# Patient Record
Sex: FEMALE | Race: WHITE | URBAN - METROPOLITAN AREA
[De-identification: names, ages, dates, MRNs, and addresses within clinical notes are randomized per-mention and may not be internally consistent; named-entity substitution may affect disease eponyms.]

---

## 2017-02-06 RX ORDER — PROPRANOLOL HYDROCHLORIDE 20 MG/1
20 TABLET ORAL 2 TIMES DAILY
Qty: 180 TABLET | Refills: 0 | Status: SHIPPED | OUTPATIENT
Start: 2017-02-06 | End: 2017-02-14 | Stop reason: SDUPTHER

## 2017-02-06 RX ORDER — ATORVASTATIN CALCIUM 10 MG/1
TABLET, FILM COATED ORAL
Qty: 30 TABLET | Refills: 0 | Status: SHIPPED | OUTPATIENT
Start: 2017-02-06 | End: 2017-02-14 | Stop reason: SDUPTHER

## 2017-02-14 ENCOUNTER — OFFICE VISIT (OUTPATIENT)
Dept: INTERNAL MEDICINE CLINIC | Age: 47
End: 2017-02-14

## 2017-02-14 VITALS
SYSTOLIC BLOOD PRESSURE: 100 MMHG | WEIGHT: 112 LBS | RESPIRATION RATE: 18 BRPM | DIASTOLIC BLOOD PRESSURE: 75 MMHG | BODY MASS INDEX: 20.61 KG/M2 | HEART RATE: 70 BPM | HEIGHT: 62 IN

## 2017-02-14 DIAGNOSIS — J45.20 MILD INTERMITTENT ASTHMA WITHOUT COMPLICATION: ICD-10-CM

## 2017-02-14 DIAGNOSIS — G43.009 NONINTRACTABLE MIGRAINE, UNSPECIFIED MIGRAINE TYPE: ICD-10-CM

## 2017-02-14 DIAGNOSIS — E78.2 MIXED HYPERLIPIDEMIA: ICD-10-CM

## 2017-02-14 DIAGNOSIS — J30.2 SEASONAL ALLERGIC RHINITIS, UNSPECIFIED ALLERGIC RHINITIS TRIGGER: ICD-10-CM

## 2017-02-14 DIAGNOSIS — K21.9 GASTROESOPHAGEAL REFLUX DISEASE WITHOUT ESOPHAGITIS: Primary | ICD-10-CM

## 2017-02-14 PROCEDURE — 99214 OFFICE O/P EST MOD 30 MIN: CPT | Performed by: INTERNAL MEDICINE

## 2017-02-14 RX ORDER — METHOCARBAMOL 500 MG/1
500 TABLET, FILM COATED ORAL 2 TIMES DAILY PRN
Qty: 60 TABLET | Refills: 2 | Status: SHIPPED | OUTPATIENT
Start: 2017-02-14

## 2017-02-14 RX ORDER — ATORVASTATIN CALCIUM 10 MG/1
TABLET, FILM COATED ORAL
Qty: 90 TABLET | Refills: 1 | Status: SHIPPED | OUTPATIENT
Start: 2017-02-14

## 2017-02-14 RX ORDER — PROPRANOLOL HYDROCHLORIDE 20 MG/1
20 TABLET ORAL 2 TIMES DAILY
Qty: 180 TABLET | Refills: 1 | Status: SHIPPED | OUTPATIENT
Start: 2017-02-14

## 2017-03-20 ENCOUNTER — TELEPHONE (OUTPATIENT)
Dept: INTERNAL MEDICINE CLINIC | Age: 47
End: 2017-03-20

## 2017-04-03 ENCOUNTER — TELEPHONE (OUTPATIENT)
Dept: INTERNAL MEDICINE CLINIC | Age: 47
End: 2017-04-03

## 2017-04-11 RX ORDER — CIMETIDINE 400 MG/1
TABLET, FILM COATED ORAL
Qty: 90 TABLET | Refills: 0 | Status: SHIPPED | OUTPATIENT
Start: 2017-04-11

## 2024-02-01 ENCOUNTER — OFFICE VISIT (OUTPATIENT)
Age: 54
End: 2024-02-01

## 2024-02-01 VITALS
WEIGHT: 111.2 LBS | HEART RATE: 109 BPM | TEMPERATURE: 98.3 F | BODY MASS INDEX: 20.34 KG/M2 | SYSTOLIC BLOOD PRESSURE: 136 MMHG | DIASTOLIC BLOOD PRESSURE: 79 MMHG | OXYGEN SATURATION: 95 %

## 2024-02-01 DIAGNOSIS — H65.01 RIGHT ACUTE SEROUS OTITIS MEDIA, RECURRENCE NOT SPECIFIED: Primary | ICD-10-CM

## 2024-02-01 DIAGNOSIS — S16.1XXA NECK STRAIN, INITIAL ENCOUNTER: ICD-10-CM

## 2024-02-01 RX ORDER — AMOXICILLIN 875 MG/1
875 TABLET, COATED ORAL 2 TIMES DAILY
Qty: 20 TABLET | Refills: 0 | Status: SHIPPED | OUTPATIENT
Start: 2024-02-01 | End: 2024-02-11

## 2024-02-01 RX ORDER — METHOCARBAMOL 750 MG/1
750 TABLET, FILM COATED ORAL 3 TIMES DAILY
Qty: 20 TABLET | Refills: 0 | Status: SHIPPED | OUTPATIENT
Start: 2024-02-01 | End: 2024-02-11

## 2024-02-01 ASSESSMENT — ENCOUNTER SYMPTOMS
GASTROINTESTINAL NEGATIVE: 1
RESPIRATORY NEGATIVE: 1
EYES NEGATIVE: 1
EYE PAIN: 0

## 2024-02-01 NOTE — PROGRESS NOTES
Edwina Bacon (:  1970) is a 53 y.o. female,New patient, here for evaluation of the following chief complaint(s):  Otalgia (Right ear pain  popping 1 month /Back and neck pain spasm last week )      ASSESSMENT/PLAN:    ICD-10-CM    1. Right acute serous otitis media, recurrence not specified  H65.01 amoxicillin (AMOXIL) 875 MG tablet     Jeramie Madsen MD, Otolaryngology, [facility]      2. Neck strain, initial encounter  S16.1XXA methocarbamol (ROBAXIN-750) 750 MG tablet          Patient is a thin 53-year-old female in no distress.  Patient does not appear toxic.  Examination of the right ear reveals a bolus fluid noted behind the right TM consistent with some serous effusion.  The left 1 was normal.  Intermittent hearing loss noted.  It is decreased.  Patient will be placed on amoxicillin.  Patient was also seen for some neck strain after some moving.  I suspect it was more muscle skeletal placed on Robaxin patient had it before.  I told patient given the chronicity of this patient may need to see a ENT specialist.  A referral given.  Patient will follow-up.  Patient discharged in good condition.    Follow up in 7 days if symptoms persist or if symptoms worsen.    SUBJECTIVE/OBJECTIVE:  Patient is a 53-year-old white female who smokes quite a bit presents with 1 month history of intermittent right hearing loss and pain.  Patient states feels like there is fluid in her ear.  At times she can hear and unable to pop her ears.  The left 1 is fine.  Patient has not been to a doctor in a while.  There has been no cough no congestion.  Intermittent hearing loss.  Patient has no chills or fever.            Vitals:    24 1306   BP: 136/79   Site: Right Upper Arm   Position: Sitting   Cuff Size: Large Adult   Pulse: (!) 109   Temp: 98.3 °F (36.8 °C)   TempSrc: Oral   SpO2: 95%   Weight: 50.4 kg (111 lb 3.2 oz)       Review of Systems   Constitutional: Negative.    HENT:  Positive for ear  (3) walks occasionally

## 2024-03-12 ENCOUNTER — OFFICE VISIT (OUTPATIENT)
Age: 54
End: 2024-03-12

## 2024-03-12 VITALS
BODY MASS INDEX: 20.5 KG/M2 | RESPIRATION RATE: 20 BRPM | HEIGHT: 62 IN | HEART RATE: 116 BPM | DIASTOLIC BLOOD PRESSURE: 79 MMHG | OXYGEN SATURATION: 95 % | SYSTOLIC BLOOD PRESSURE: 132 MMHG | TEMPERATURE: 98.4 F | WEIGHT: 111.4 LBS

## 2024-03-12 DIAGNOSIS — H66.92 ACUTE LEFT OTITIS MEDIA: Primary | ICD-10-CM

## 2024-03-12 RX ORDER — CEFDINIR 300 MG/1
300 CAPSULE ORAL 2 TIMES DAILY
Qty: 20 CAPSULE | Refills: 0 | Status: SHIPPED | OUTPATIENT
Start: 2024-03-12 | End: 2024-03-22

## 2024-03-12 RX ORDER — PREDNISONE 20 MG/1
20 TABLET ORAL 2 TIMES DAILY
Qty: 10 TABLET | Refills: 0 | Status: SHIPPED | OUTPATIENT
Start: 2024-03-12 | End: 2024-03-17

## 2024-03-12 ASSESSMENT — ENCOUNTER SYMPTOMS
SINUS PRESSURE: 0
DIARRHEA: 0
ABDOMINAL PAIN: 0
SORE THROAT: 0
VOMITING: 0
RHINORRHEA: 0
COUGH: 0
EYE REDNESS: 0

## 2024-03-12 NOTE — PROGRESS NOTES
erythematous and bulging.      Nose: No congestion or rhinorrhea.      Mouth/Throat:      Mouth: Mucous membranes are moist.      Pharynx: No posterior oropharyngeal erythema.   Eyes:      Conjunctiva/sclera: Conjunctivae normal.      Pupils: Pupils are equal, round, and reactive to light.   Cardiovascular:      Rate and Rhythm: Normal rate and regular rhythm.   Pulmonary:      Effort: Pulmonary effort is normal. No respiratory distress.      Breath sounds: Normal breath sounds.   Musculoskeletal:      Cervical back: Neck supple. No rigidity.   Lymphadenopathy:      Cervical: No cervical adenopathy.   Skin:     Findings: No rash.   Neurological:      General: No focal deficit present.      Mental Status: She is alert and oriented to person, place, and time.           An electronic signature was used to authenticate this note.    --NATHANAEL ISAAC MD

## 2024-04-20 ENCOUNTER — OFFICE VISIT (OUTPATIENT)
Age: 54
End: 2024-04-20

## 2024-04-20 VITALS
BODY MASS INDEX: 20.8 KG/M2 | WEIGHT: 113 LBS | TEMPERATURE: 98 F | RESPIRATION RATE: 18 BRPM | DIASTOLIC BLOOD PRESSURE: 80 MMHG | HEART RATE: 112 BPM | OXYGEN SATURATION: 97 % | HEIGHT: 62 IN | SYSTOLIC BLOOD PRESSURE: 160 MMHG

## 2024-04-20 DIAGNOSIS — I10 HYPERTENSION, UNSPECIFIED TYPE: ICD-10-CM

## 2024-04-20 DIAGNOSIS — J02.9 ACUTE PHARYNGITIS, UNSPECIFIED ETIOLOGY: Primary | ICD-10-CM

## 2024-04-20 RX ORDER — IBUPROFEN 600 MG/1
600 TABLET ORAL 3 TIMES DAILY PRN
Qty: 30 TABLET | Refills: 0 | Status: SHIPPED | OUTPATIENT
Start: 2024-04-20

## 2024-04-20 RX ORDER — LOSARTAN POTASSIUM 50 MG/1
50 TABLET ORAL DAILY
Qty: 30 TABLET | Refills: 1 | Status: SHIPPED | OUTPATIENT
Start: 2024-04-20

## 2024-04-20 RX ORDER — AMOXICILLIN 875 MG/1
875 TABLET, COATED ORAL 2 TIMES DAILY
Qty: 14 TABLET | Refills: 0 | Status: SHIPPED | OUTPATIENT
Start: 2024-04-20 | End: 2024-04-27

## 2024-04-20 ASSESSMENT — ENCOUNTER SYMPTOMS
DIARRHEA: 0
SHORTNESS OF BREATH: 0
ABDOMINAL PAIN: 0
NAUSEA: 0
WHEEZING: 0
SORE THROAT: 1
VOMITING: 0
COUGH: 0
RHINORRHEA: 0

## 2024-04-20 NOTE — PROGRESS NOTES
Edwina Bacon (:  1970) is a 53 y.o. female,Established patient, here for evaluation of the following chief complaint(s):  Pharyngitis      ASSESSMENT/PLAN:  1. Acute pharyngitis, unspecified etiology    - amoxicillin (AMOXIL) 875 MG tablet; Take 1 tablet by mouth 2 times daily for 7 days  Dispense: 14 tablet; Refill: 0  - ibuprofen (ADVIL;MOTRIN) 600 MG tablet; Take 1 tablet by mouth 3 times daily as needed for Pain  Dispense: 30 tablet; Refill: 0    2. Hypertension, unspecified type    - losartan (COZAAR) 50 MG tablet; Take 1 tablet by mouth daily  Dispense: 30 tablet; Refill: 1     -pt was advised to f/u with her PCP in 1-2 wks  No follow-ups on file.    SUBJECTIVE/OBJECTIVE:  Pt has a h/o HTN,not taking medication      History provided by:  Patient  Pharyngitis  Severity:  Moderate  Onset quality:  Gradual  Duration:  2 days  Timing:  Constant  Progression:  Worsening  Chronicity:  New  Associated symptoms: ear pain, headaches and sore throat    Associated symptoms: no abdominal pain, no chest pain, no congestion, no cough, no diarrhea, no fatigue, no fever, no loss of consciousness, no myalgias, no nausea, no rash, no rhinorrhea, no shortness of breath, no vomiting and no wheezing        Vitals:    24 1043   BP: (!) 160/80   Pulse: (!) 112   Resp: 18   Temp: 98 °F (36.7 °C)   SpO2: 97%   Weight: 51.3 kg (113 lb)   Height: 1.575 m (5' 2\")       Review of Systems   Constitutional:  Negative for fatigue and fever.   HENT:  Positive for ear pain and sore throat. Negative for congestion and rhinorrhea.    Respiratory:  Negative for cough, shortness of breath and wheezing.    Cardiovascular:  Negative for chest pain.   Gastrointestinal:  Negative for abdominal pain, diarrhea, nausea and vomiting.   Musculoskeletal:  Negative for myalgias.   Skin:  Negative for rash.   Neurological:  Positive for headaches. Negative for loss of consciousness.       Physical Exam  Constitutional:       General: She

## 2024-04-21 ENCOUNTER — TELEPHONE (OUTPATIENT)
Age: 54
End: 2024-04-21

## 2024-05-12 DIAGNOSIS — I10 HYPERTENSION, UNSPECIFIED TYPE: ICD-10-CM

## 2024-05-15 RX ORDER — LOSARTAN POTASSIUM 50 MG/1
50 TABLET ORAL DAILY
Qty: 90 TABLET | Refills: 1 | OUTPATIENT
Start: 2024-05-15

## 2024-05-30 ENCOUNTER — OFFICE VISIT (OUTPATIENT)
Age: 54
End: 2024-05-30

## 2024-05-30 VITALS
HEART RATE: 106 BPM | WEIGHT: 115.2 LBS | SYSTOLIC BLOOD PRESSURE: 135 MMHG | RESPIRATION RATE: 18 BRPM | HEIGHT: 62 IN | BODY MASS INDEX: 21.2 KG/M2 | DIASTOLIC BLOOD PRESSURE: 83 MMHG | OXYGEN SATURATION: 96 % | TEMPERATURE: 98 F

## 2024-05-30 DIAGNOSIS — H66.004 RECURRENT ACUTE SUPPURATIVE OTITIS MEDIA OF RIGHT EAR WITHOUT SPONTANEOUS RUPTURE OF TYMPANIC MEMBRANE: ICD-10-CM

## 2024-05-30 DIAGNOSIS — S39.012A LOW BACK STRAIN, INITIAL ENCOUNTER: Primary | ICD-10-CM

## 2024-05-30 RX ORDER — METHOCARBAMOL 750 MG/1
750 TABLET, FILM COATED ORAL 4 TIMES DAILY
Qty: 20 TABLET | Refills: 0 | Status: SHIPPED | OUTPATIENT
Start: 2024-05-30

## 2024-05-30 RX ORDER — AMOXICILLIN AND CLAVULANATE POTASSIUM 875; 125 MG/1; MG/1
1 TABLET, FILM COATED ORAL 2 TIMES DAILY
Qty: 20 TABLET | Refills: 0 | Status: SHIPPED | OUTPATIENT
Start: 2024-05-30 | End: 2024-06-09

## 2024-05-30 ASSESSMENT — ENCOUNTER SYMPTOMS
SORE THROAT: 0
BACK PAIN: 1
RHINORRHEA: 0

## 2024-05-30 NOTE — PROGRESS NOTES
Edwina Bacon (:  1970) is a 53 y.o. female,Established patient, here for evaluation of the following chief complaint(s):  Otalgia (Pt c/o ears feel clogged, painful. Pt states it feels like fluid is inside ears )      ASSESSMENT/PLAN:    ICD-10-CM    1. Low back strain, initial encounter  S39.012A Referral for No Primary Care Physician - Routine     methocarbamol (ROBAXIN-750) 750 MG tablet      2. Recurrent acute suppurative otitis media of right ear without spontaneous rupture of tympanic membrane  H66.004 Referral for No Primary Care Physician - Routine     amoxicillin-clavulanate (AUGMENTIN) 875-125 MG per tablet        Clinical exam consistent with low back strain  Recurrent otitis media    Patient verbalized understanding of printed and verbal discharge instructions including follow up care.    Follow up in 7 days if symptoms persist or if symptoms worsen.    SUBJECTIVE/OBJECTIVE:    History provided by:  Patient  Otalgia   There is pain in the right ear. This is a recurrent problem. The current episode started yesterday. The problem has been unchanged. There has been no fever. The pain is at a severity of 6/10. Pertinent negatives include no ear discharge, rhinorrhea or sore throat. She has tried nothing for the symptoms. The treatment provided no relief.           Vitals:    24 1424   BP: 135/83   Pulse: (!) 106   Resp: 18   Temp: 98 °F (36.7 °C)   SpO2: 96%   Weight: 52.3 kg (115 lb 3.2 oz)   Height: 1.575 m (5' 2\")       Review of Systems   HENT:  Positive for ear pain. Negative for ear discharge, rhinorrhea and sore throat.    Musculoskeletal:  Positive for back pain.       Physical Exam  Constitutional:       Appearance: Normal appearance.   HENT:      Right Ear: Tenderness present. Tympanic membrane is injected, erythematous and bulging.      Left Ear: Tympanic membrane, ear canal and external ear normal.      Nose:      Right Sinus: No maxillary sinus tenderness or frontal sinus

## 2024-06-03 ENCOUNTER — OFFICE VISIT (OUTPATIENT)
Dept: INTERNAL MEDICINE CLINIC | Age: 54
End: 2024-06-03
Payer: MEDICARE

## 2024-06-03 VITALS
BODY MASS INDEX: 20.8 KG/M2 | WEIGHT: 113 LBS | OXYGEN SATURATION: 97 % | SYSTOLIC BLOOD PRESSURE: 122 MMHG | HEART RATE: 105 BPM | DIASTOLIC BLOOD PRESSURE: 78 MMHG | HEIGHT: 62 IN

## 2024-06-03 DIAGNOSIS — Z11.3 SCREEN FOR STD (SEXUALLY TRANSMITTED DISEASE): ICD-10-CM

## 2024-06-03 DIAGNOSIS — J45.20 MILD INTERMITTENT ASTHMA WITHOUT COMPLICATION: ICD-10-CM

## 2024-06-03 DIAGNOSIS — J30.2 SEASONAL ALLERGIES: ICD-10-CM

## 2024-06-03 DIAGNOSIS — Z00.00 MEDICARE ANNUAL WELLNESS VISIT, SUBSEQUENT: ICD-10-CM

## 2024-06-03 DIAGNOSIS — I10 HYPERTENSION, UNSPECIFIED TYPE: ICD-10-CM

## 2024-06-03 DIAGNOSIS — E78.2 MIXED HYPERLIPIDEMIA: Primary | ICD-10-CM

## 2024-06-03 DIAGNOSIS — K21.9 GASTROESOPHAGEAL REFLUX DISEASE WITHOUT ESOPHAGITIS: ICD-10-CM

## 2024-06-03 DIAGNOSIS — E78.2 MIXED HYPERLIPIDEMIA: ICD-10-CM

## 2024-06-03 DIAGNOSIS — M54.50 ACUTE BILATERAL LOW BACK PAIN WITHOUT SCIATICA: ICD-10-CM

## 2024-06-03 DIAGNOSIS — Z23 NEED FOR PROPHYLACTIC VACCINATION AGAINST STREPTOCOCCUS PNEUMONIAE (PNEUMOCOCCUS): ICD-10-CM

## 2024-06-03 DIAGNOSIS — G43.909 MIGRAINE WITHOUT STATUS MIGRAINOSUS, NOT INTRACTABLE, UNSPECIFIED MIGRAINE TYPE: ICD-10-CM

## 2024-06-03 LAB — HCV AB SERPL QL IA: NORMAL

## 2024-06-03 PROCEDURE — 3017F COLORECTAL CA SCREEN DOC REV: CPT | Performed by: INTERNAL MEDICINE

## 2024-06-03 PROCEDURE — 4004F PT TOBACCO SCREEN RCVD TLK: CPT | Performed by: INTERNAL MEDICINE

## 2024-06-03 PROCEDURE — G0439 PPPS, SUBSEQ VISIT: HCPCS | Performed by: INTERNAL MEDICINE

## 2024-06-03 PROCEDURE — G8427 DOCREV CUR MEDS BY ELIG CLIN: HCPCS | Performed by: INTERNAL MEDICINE

## 2024-06-03 PROCEDURE — 90677 PCV20 VACCINE IM: CPT | Performed by: INTERNAL MEDICINE

## 2024-06-03 PROCEDURE — 3078F DIAST BP <80 MM HG: CPT | Performed by: INTERNAL MEDICINE

## 2024-06-03 PROCEDURE — G0009 ADMIN PNEUMOCOCCAL VACCINE: HCPCS | Performed by: INTERNAL MEDICINE

## 2024-06-03 PROCEDURE — 99204 OFFICE O/P NEW MOD 45 MIN: CPT | Performed by: INTERNAL MEDICINE

## 2024-06-03 PROCEDURE — G8420 CALC BMI NORM PARAMETERS: HCPCS | Performed by: INTERNAL MEDICINE

## 2024-06-03 PROCEDURE — 3074F SYST BP LT 130 MM HG: CPT | Performed by: INTERNAL MEDICINE

## 2024-06-03 RX ORDER — FEXOFENADINE HCL 180 MG/1
180 TABLET ORAL DAILY
Qty: 30 TABLET | Refills: 5 | Status: SHIPPED | OUTPATIENT
Start: 2024-06-03 | End: 2024-11-30

## 2024-06-03 RX ORDER — FLUTICASONE PROPIONATE 50 MCG
1 SPRAY, SUSPENSION (ML) NASAL DAILY
Qty: 16 G | Refills: 0 | Status: SHIPPED | OUTPATIENT
Start: 2024-06-03

## 2024-06-03 RX ORDER — MONTELUKAST SODIUM 10 MG/1
10 TABLET ORAL DAILY
Qty: 30 TABLET | Refills: 3 | Status: SHIPPED | OUTPATIENT
Start: 2024-06-03

## 2024-06-03 RX ORDER — ASPIRIN 325 MG
325 TABLET ORAL 2 TIMES DAILY
COMMUNITY

## 2024-06-03 RX ORDER — METHOCARBAMOL 750 MG/1
750 TABLET, FILM COATED ORAL 4 TIMES DAILY
Qty: 20 TABLET | Refills: 0 | Status: SHIPPED | OUTPATIENT
Start: 2024-06-03

## 2024-06-03 RX ORDER — PREDNISONE 20 MG/1
20 TABLET ORAL 2 TIMES DAILY
Qty: 10 TABLET | Refills: 0 | Status: SHIPPED | OUTPATIENT
Start: 2024-06-03 | End: 2024-06-08

## 2024-06-03 SDOH — ECONOMIC STABILITY: FOOD INSECURITY: WITHIN THE PAST 12 MONTHS, THE FOOD YOU BOUGHT JUST DIDN'T LAST AND YOU DIDN'T HAVE MONEY TO GET MORE.: PATIENT DECLINED

## 2024-06-03 SDOH — ECONOMIC STABILITY: INCOME INSECURITY: HOW HARD IS IT FOR YOU TO PAY FOR THE VERY BASICS LIKE FOOD, HOUSING, MEDICAL CARE, AND HEATING?: PATIENT DECLINED

## 2024-06-03 SDOH — ECONOMIC STABILITY: HOUSING INSECURITY
IN THE LAST 12 MONTHS, WAS THERE A TIME WHEN YOU DID NOT HAVE A STEADY PLACE TO SLEEP OR SLEPT IN A SHELTER (INCLUDING NOW)?: PATIENT DECLINED

## 2024-06-03 SDOH — ECONOMIC STABILITY: FOOD INSECURITY: WITHIN THE PAST 12 MONTHS, YOU WORRIED THAT YOUR FOOD WOULD RUN OUT BEFORE YOU GOT MONEY TO BUY MORE.: PATIENT DECLINED

## 2024-06-03 ASSESSMENT — PATIENT HEALTH QUESTIONNAIRE - PHQ9
SUM OF ALL RESPONSES TO PHQ QUESTIONS 1-9: 15
8. MOVING OR SPEAKING SO SLOWLY THAT OTHER PEOPLE COULD HAVE NOTICED. OR THE OPPOSITE, BEING SO FIGETY OR RESTLESS THAT YOU HAVE BEEN MOVING AROUND A LOT MORE THAN USUAL: SEVERAL DAYS
1. LITTLE INTEREST OR PLEASURE IN DOING THINGS: MORE THAN HALF THE DAYS
4. FEELING TIRED OR HAVING LITTLE ENERGY: NEARLY EVERY DAY
SUM OF ALL RESPONSES TO PHQ QUESTIONS 1-9: 15
6. FEELING BAD ABOUT YOURSELF - OR THAT YOU ARE A FAILURE OR HAVE LET YOURSELF OR YOUR FAMILY DOWN: NOT AT ALL
SUM OF ALL RESPONSES TO PHQ9 QUESTIONS 1 & 2: 5
10. IF YOU CHECKED OFF ANY PROBLEMS, HOW DIFFICULT HAVE THESE PROBLEMS MADE IT FOR YOU TO DO YOUR WORK, TAKE CARE OF THINGS AT HOME, OR GET ALONG WITH OTHER PEOPLE: SOMEWHAT DIFFICULT
SUM OF ALL RESPONSES TO PHQ QUESTIONS 1-9: 15
5. POOR APPETITE OR OVEREATING: NOT AT ALL
3. TROUBLE FALLING OR STAYING ASLEEP: NEARLY EVERY DAY
9. THOUGHTS THAT YOU WOULD BE BETTER OFF DEAD, OR OF HURTING YOURSELF: NOT AT ALL
2. FEELING DOWN, DEPRESSED OR HOPELESS: NEARLY EVERY DAY
7. TROUBLE CONCENTRATING ON THINGS, SUCH AS READING THE NEWSPAPER OR WATCHING TELEVISION: NEARLY EVERY DAY
SUM OF ALL RESPONSES TO PHQ QUESTIONS 1-9: 15

## 2024-06-03 ASSESSMENT — ENCOUNTER SYMPTOMS
COUGH: 0
CONSTIPATION: 0
BLOOD IN STOOL: 0
WHEEZING: 0
COLOR CHANGE: 0
SHORTNESS OF BREATH: 0
CHEST TIGHTNESS: 0
ABDOMINAL PAIN: 0
SINUS PAIN: 0

## 2024-06-03 ASSESSMENT — LIFESTYLE VARIABLES
HOW MANY STANDARD DRINKS CONTAINING ALCOHOL DO YOU HAVE ON A TYPICAL DAY: PATIENT DOES NOT DRINK
HOW OFTEN DO YOU HAVE A DRINK CONTAINING ALCOHOL: NEVER

## 2024-06-03 NOTE — PROGRESS NOTES
Edwina Bacon (:  1970) is a 53 y.o. female,Established patient, here for evaluation of the following chief complaint(s):  Established New Doctor and Medicare AWV      Assessment & Plan   ASSESSMENT/PLAN:  1. Mixed hyperlipidemia  -     Lipid Panel; Future  -     Comprehensive Metabolic Panel; Future  2. Mild intermittent asthma without complication  -     CBC with Auto Differential; Future  3. Migraine without status migrainosus, not intractable, unspecified migraine type  4. Gastroesophageal reflux disease without esophagitis  5. Seasonal allergies  -     montelukast (SINGULAIR) 10 MG tablet; Take 1 tablet by mouth daily, Disp-30 tablet, R-3Normal  -     fexofenadine (ALLEGRA) 180 MG tablet; Take 1 tablet by mouth daily, Disp-30 tablet, R-5Normal  -     fluticasone (FLONASE) 50 MCG/ACT nasal spray; 1 spray by Each Nostril route daily, Disp-16 g, R-0Normal  6. Acute bilateral low back pain without sciatica  -     predniSONE (DELTASONE) 20 MG tablet; Take 1 tablet by mouth 2 times daily for 5 days, Disp-10 tablet, R-0Normal  -     methocarbamol (ROBAXIN-750) 750 MG tablet; Take 1 tablet by mouth 4 times daily, Disp-20 tablet, R-0Normal  7. Need for prophylactic vaccination against Streptococcus pneumoniae (pneumococcus)  -     Pneumococcal, PCV20, PREVNAR 20, (age 6w+), IM, PF  8. Screen for STD (sexually transmitted disease)  -     Hepatitis C Antibody; Future  -     HIV Screen; Future  -     Syphilis Antibody Cascading Reflex; Future  -     C.trachomatis N.gonorrhoeae DNA, Urine; Future  9. Medicare annual wellness visit, subsequent  Patient is presenting as a new patient today most of her problems rotate around having a lot of congestion and allergies we did discuss the importance of taking medications properly and at this point we can have her start back on antihistamines with the nasal cortisone as well as Singulair    The patient has been having some back pain since she is to visit the

## 2024-06-04 LAB
ALBUMIN SERPL-MCNC: 4.7 G/DL (ref 3.4–5)
ALBUMIN/GLOB SERPL: 2.2 {RATIO} (ref 1.1–2.2)
ALP SERPL-CCNC: 54 U/L (ref 40–129)
ALT SERPL-CCNC: 31 U/L (ref 10–40)
ANION GAP SERPL CALCULATED.3IONS-SCNC: 14 MMOL/L (ref 3–16)
AST SERPL-CCNC: 28 U/L (ref 15–37)
BASOPHILS # BLD: 0.1 K/UL (ref 0–0.2)
BASOPHILS NFR BLD: 0.9 %
BILIRUB SERPL-MCNC: <0.2 MG/DL (ref 0–1)
BUN SERPL-MCNC: 8 MG/DL (ref 7–20)
C TRACH DNA UR QL NAA+PROBE: NEGATIVE
CALCIUM SERPL-MCNC: 10.1 MG/DL (ref 8.3–10.6)
CHLORIDE SERPL-SCNC: 97 MMOL/L (ref 99–110)
CHOLEST SERPL-MCNC: 217 MG/DL (ref 0–199)
CO2 SERPL-SCNC: 24 MMOL/L (ref 21–32)
CREAT SERPL-MCNC: 0.7 MG/DL (ref 0.6–1.1)
DEPRECATED RDW RBC AUTO: 13.2 % (ref 12.4–15.4)
EOSINOPHIL NFR BLD: 7.4 %
GFR SERPLBLD CREATININE-BSD FMLA CKD-EPI: >90 ML/MIN/{1.73_M2}
GLUCOSE SERPL-MCNC: 80 MG/DL (ref 70–99)
HCT VFR BLD AUTO: 38.8 % (ref 36–48)
HDLC SERPL-MCNC: 59 MG/DL (ref 40–60)
HGB BLD-MCNC: 13.5 G/DL (ref 12–16)
HIV 1+2 AB+HIV1 P24 AG SERPL QL IA: NORMAL
HIV 2 AB SERPL QL IA: NORMAL
HIV1 AB SERPL QL IA: NORMAL
HIV1 P24 AG SERPL QL IA: NORMAL
LDLC SERPL CALC-MCNC: 125 MG/DL
LYMPHOCYTES # BLD: 1.9 K/UL (ref 1–5.1)
LYMPHOCYTES NFR BLD: 18.7 %
MCH RBC QN AUTO: 32.4 PG (ref 26–34)
MCHC RBC AUTO-ENTMCNC: 34.9 G/DL (ref 31–36)
MCV RBC AUTO: 92.7 FL (ref 80–100)
MONOCYTES # BLD: 0.5 K/UL (ref 0–1.3)
MONOCYTES NFR BLD: 5.1 %
N GONORRHOEA DNA UR QL NAA+PROBE: NEGATIVE
NEUTROPHILS # BLD: 6.9 K/UL (ref 1.7–7.7)
NEUTROPHILS NFR BLD: 67.9 %
PLATELET # BLD AUTO: 310 K/UL (ref 135–450)
PMV BLD AUTO: 7.9 FL (ref 5–10.5)
POTASSIUM SERPL-SCNC: 4.9 MMOL/L (ref 3.5–5.1)
PROT SERPL-MCNC: 6.8 G/DL (ref 6.4–8.2)
RBC # BLD AUTO: 4.18 M/UL (ref 4–5.2)
REAGIN+T PALLIDUM IGG+IGM SERPL-IMP: NORMAL
SODIUM SERPL-SCNC: 135 MMOL/L (ref 136–145)
TRIGL SERPL-MCNC: 165 MG/DL (ref 0–150)
VLDLC SERPL CALC-MCNC: 33 MG/DL
WBC # BLD AUTO: 10.2 K/UL (ref 4–11)

## 2024-06-04 NOTE — RESULT ENCOUNTER NOTE
Please advise patient that the results were acceptable with the exception of the cholesterol and sugar being elevated, the patient is advised to work on aggressive lifestyle modification including different food choices as well as lowering the fat content of the diet in addition to daily exercises with a minimum of 150 minutes/week in 10-minute increments of mild to moderate exercise like brisk walking    The 10-year ASCVD risk score (Elder YOUNG, et al., 2019) is: 4.7%    Values used to calculate the score:      Age: 53 years      Sex: Female      Is Non- : No      Diabetic: No      Tobacco smoker: Yes      Systolic Blood Pressure: 122 mmHg      Is BP treated: Yes      HDL Cholesterol: 59 mg/dL      Total Cholesterol: 217 mg/dL

## 2024-06-05 ENCOUNTER — PATIENT MESSAGE (OUTPATIENT)
Dept: INTERNAL MEDICINE CLINIC | Age: 54
End: 2024-06-05

## 2024-06-05 DIAGNOSIS — M54.50 ACUTE BILATERAL LOW BACK PAIN WITHOUT SCIATICA: Primary | ICD-10-CM

## 2024-06-05 DIAGNOSIS — I10 HYPERTENSION, UNSPECIFIED TYPE: ICD-10-CM

## 2024-06-05 RX ORDER — LOSARTAN POTASSIUM 50 MG/1
50 TABLET ORAL DAILY
Qty: 30 TABLET | Refills: 1 | OUTPATIENT
Start: 2024-06-05

## 2024-06-06 RX ORDER — TIZANIDINE 2 MG/1
2 TABLET ORAL 3 TIMES DAILY PRN
Qty: 30 TABLET | Refills: 0 | Status: SHIPPED | OUTPATIENT
Start: 2024-06-06

## 2024-06-06 NOTE — TELEPHONE ENCOUNTER
From: Edwina Bacon  To: Dr. Dale Senior  Sent: 6/5/2024 2:59 PM EDT  Subject: Methocarbamol 750mg    Received a letter today from prescription drug plan stating that they only fill temporary supply of multiple fills to provide up maykel 30 day supply . In the event that the muscle relaxer is still needed the prescription drug will have to be changed or requesting an approval. I will try and bring the letter with me to my appointment in July.  Edwina Bacon

## 2024-06-10 RX ORDER — LOSARTAN POTASSIUM 50 MG/1
50 TABLET ORAL DAILY
Qty: 30 TABLET | Refills: 1 | OUTPATIENT
Start: 2024-06-10

## 2024-06-16 DIAGNOSIS — I10 HYPERTENSION, UNSPECIFIED TYPE: ICD-10-CM

## 2024-06-17 RX ORDER — LOSARTAN POTASSIUM 50 MG/1
50 TABLET ORAL DAILY
Qty: 30 TABLET | Refills: 1 | OUTPATIENT
Start: 2024-06-17

## 2024-06-18 ENCOUNTER — TELEPHONE (OUTPATIENT)
Dept: INTERNAL MEDICINE CLINIC | Age: 54
End: 2024-06-18

## 2024-06-18 DIAGNOSIS — I10 HYPERTENSION, UNSPECIFIED TYPE: ICD-10-CM

## 2024-06-18 RX ORDER — LOSARTAN POTASSIUM 50 MG/1
50 TABLET ORAL DAILY
Qty: 30 TABLET | Refills: 1 | Status: SHIPPED | OUTPATIENT
Start: 2024-06-18

## 2024-06-18 NOTE — TELEPHONE ENCOUNTER
[facility]  calling requesting refill of Losartan     Last written 4/24/24 by another   Last OV 6/3/24  Next OV 6/25/24  Last recommended OV NA     Please send to [facility] [place]

## 2024-06-25 ENCOUNTER — OFFICE VISIT (OUTPATIENT)
Dept: INTERNAL MEDICINE CLINIC | Age: 54
End: 2024-06-25
Payer: MEDICARE

## 2024-06-25 VITALS
WEIGHT: 113 LBS | HEART RATE: 104 BPM | HEIGHT: 62 IN | DIASTOLIC BLOOD PRESSURE: 80 MMHG | BODY MASS INDEX: 20.8 KG/M2 | OXYGEN SATURATION: 96 % | SYSTOLIC BLOOD PRESSURE: 124 MMHG

## 2024-06-25 DIAGNOSIS — J30.2 SEASONAL ALLERGIES: Primary | ICD-10-CM

## 2024-06-25 DIAGNOSIS — F20.0 PARANOID SCHIZOPHRENIA (HCC): ICD-10-CM

## 2024-06-25 DIAGNOSIS — M54.50 ACUTE BILATERAL LOW BACK PAIN WITHOUT SCIATICA: ICD-10-CM

## 2024-06-25 DIAGNOSIS — J45.20 MILD INTERMITTENT ASTHMA WITHOUT COMPLICATION: ICD-10-CM

## 2024-06-25 DIAGNOSIS — Z11.1 TUBERCULOSIS SCREENING: ICD-10-CM

## 2024-06-25 DIAGNOSIS — Z12.11 COLON CANCER SCREENING: ICD-10-CM

## 2024-06-25 DIAGNOSIS — F41.9 ACUTE ANXIETY: ICD-10-CM

## 2024-06-25 DIAGNOSIS — I10 ESSENTIAL HYPERTENSION: ICD-10-CM

## 2024-06-25 DIAGNOSIS — Z12.31 SCREENING MAMMOGRAM FOR HIGH-RISK PATIENT: ICD-10-CM

## 2024-06-25 PROCEDURE — G8420 CALC BMI NORM PARAMETERS: HCPCS | Performed by: INTERNAL MEDICINE

## 2024-06-25 PROCEDURE — G2211 COMPLEX E/M VISIT ADD ON: HCPCS | Performed by: INTERNAL MEDICINE

## 2024-06-25 PROCEDURE — 3079F DIAST BP 80-89 MM HG: CPT | Performed by: INTERNAL MEDICINE

## 2024-06-25 PROCEDURE — 3074F SYST BP LT 130 MM HG: CPT | Performed by: INTERNAL MEDICINE

## 2024-06-25 PROCEDURE — 99214 OFFICE O/P EST MOD 30 MIN: CPT | Performed by: INTERNAL MEDICINE

## 2024-06-25 PROCEDURE — G8427 DOCREV CUR MEDS BY ELIG CLIN: HCPCS | Performed by: INTERNAL MEDICINE

## 2024-06-25 PROCEDURE — 3017F COLORECTAL CA SCREEN DOC REV: CPT | Performed by: INTERNAL MEDICINE

## 2024-06-25 PROCEDURE — 4004F PT TOBACCO SCREEN RCVD TLK: CPT | Performed by: INTERNAL MEDICINE

## 2024-06-25 RX ORDER — FEXOFENADINE HCL 180 MG/1
180 TABLET ORAL DAILY
Qty: 30 TABLET | Refills: 5 | Status: SHIPPED | OUTPATIENT
Start: 2024-06-25 | End: 2024-12-22

## 2024-06-25 RX ORDER — ALBUTEROL SULFATE 90 UG/1
2 AEROSOL, METERED RESPIRATORY (INHALATION) 4 TIMES DAILY PRN
Qty: 18 G | Refills: 0 | Status: SHIPPED | OUTPATIENT
Start: 2024-06-25

## 2024-06-25 RX ORDER — METHOCARBAMOL 750 MG/1
750 TABLET, FILM COATED ORAL 4 TIMES DAILY
Qty: 20 TABLET | Refills: 0 | Status: SHIPPED | OUTPATIENT
Start: 2024-06-25

## 2024-06-25 RX ORDER — DOXEPIN HYDROCHLORIDE 10 MG/1
10 CAPSULE ORAL NIGHTLY
Qty: 30 CAPSULE | Refills: 3 | Status: SHIPPED | OUTPATIENT
Start: 2024-06-25

## 2024-06-25 RX ORDER — CIMETIDINE 400 MG/1
400 TABLET, FILM COATED ORAL 3 TIMES DAILY
Qty: 90 TABLET | Refills: 0 | Status: SHIPPED | OUTPATIENT
Start: 2024-06-25

## 2024-06-25 RX ORDER — AZITHROMYCIN 250 MG/1
TABLET, FILM COATED ORAL
Qty: 6 TABLET | Refills: 0 | Status: SHIPPED | OUTPATIENT
Start: 2024-06-25 | End: 2024-07-05

## 2024-06-25 ASSESSMENT — ENCOUNTER SYMPTOMS
CHEST TIGHTNESS: 0
ABDOMINAL PAIN: 0
SORE THROAT: 0
SINUS PRESSURE: 0
COLOR CHANGE: 0
COUGH: 0
SWOLLEN GLANDS: 0
SHORTNESS OF BREATH: 0
CONSTIPATION: 0
SINUS PAIN: 0
BLOOD IN STOOL: 0
WHEEZING: 0

## 2024-06-25 NOTE — PROGRESS NOTES
is for it I have no problem with her getting tuberculosis screening but her basis for getting the hepatitis B checked is because it is is in her chart that she needs hepatitis B vaccine I explained to her what that means    The patient has been having some allergies and has been taking very large doses of Benadryl and Allegra I did explain to patient that she should be taking such high doses with the side effect profile that that these drugs will have at those high doses we will keep her at the Allegra 180 mg she requested to go back on cimetidine and I do not see a problem with that but will avoid the use of combine it antihistamines will also treat her for a mild upper respite tract infection with azithromycin which should also help some of her inflammatory issues    Patient blood pressure is stable and under good control we will weston continue the same plan of care for now    I did discuss patient transfer doing preventive testing she is willing to do the Cologuard for colon cancer screening but she does not want to do her mammogram I convince her to take the test and see if she has any time in the next few months to get the test done    Return in about 3 months (around 9/25/2024).         Subjective   SUBJECTIVE/OBJECTIVE:    Lab Review   Lab Results   Component Value Date/Time     06/03/2024 03:12 PM     11/14/2016 03:28 PM     12/14/2015 05:14 PM    K 4.9 06/03/2024 03:12 PM    K 4.4 11/14/2016 03:28 PM    K 4.5 12/14/2015 05:14 PM    CO2 24 06/03/2024 03:12 PM    CO2 26 11/14/2016 03:28 PM    CO2 24 12/14/2015 05:14 PM    BUN 8 06/03/2024 03:12 PM    BUN 4 11/14/2016 03:28 PM    BUN 8 12/14/2015 05:14 PM    CREATININE 0.7 06/03/2024 03:12 PM    CREATININE 0.6 11/14/2016 03:28 PM    CREATININE 0.76 12/14/2015 05:14 PM    GLUCOSE 80 06/03/2024 03:12 PM    GLUCOSE 92 11/14/2016 03:28 PM    GLUCOSE 94 12/14/2015 05:14 PM    CALCIUM 10.1 06/03/2024 03:12 PM    CALCIUM 9.9 11/14/2016 03:28 PM

## 2024-06-27 ENCOUNTER — HOSPITAL ENCOUNTER (OUTPATIENT)
Dept: WOMENS IMAGING | Age: 54
Discharge: HOME | End: 2024-06-27
Payer: MEDICARE

## 2024-06-27 VITALS — BODY MASS INDEX: 20.8 KG/M2 | HEIGHT: 62 IN | WEIGHT: 113 LBS

## 2024-06-27 DIAGNOSIS — Z12.31 SCREENING MAMMOGRAM FOR HIGH-RISK PATIENT: ICD-10-CM

## 2024-06-27 PROCEDURE — 77063 BREAST TOMOSYNTHESIS BI: CPT

## 2024-07-01 LAB
GAMMA INTERFERON BACKGROUND BLD IA-ACNC: 0.01 IU/ML
M TB IFN-G BLD-IMP: NEGATIVE
M TB IFN-G CD4+ BCKGRND COR BLD-ACNC: 0.01 IU/ML
M TB IFN-G CD4+CD8+ BCKGRND COR BLD-ACNC: 0.03 IU/ML
MITOGEN IGNF BCKGRD COR BLD-ACNC: 9.99 IU/ML

## 2024-07-02 ENCOUNTER — TELEPHONE (OUTPATIENT)
Dept: INTERNAL MEDICINE CLINIC | Age: 54
End: 2024-07-02

## 2024-07-02 DIAGNOSIS — R22.1 NECK MASS: Primary | ICD-10-CM

## 2024-07-02 NOTE — TELEPHONE ENCOUNTER
LVM to call back to schedule to discuss and have PCP assess area of concern and to decide if testing is needed.

## 2024-07-02 NOTE — TELEPHONE ENCOUNTER
Ismael from [facility] called stating patient had her mammogram done 6/27 and during the time of the screening pt reported palpable lump near her right clavicle. Ismael is requesting if provider could order head/neck ultra sound for further evaluation. Please inform patient if US is ordered.

## 2024-07-12 ENCOUNTER — TELEPHONE (OUTPATIENT)
Dept: INTERNAL MEDICINE CLINIC | Age: 54
End: 2024-07-12

## 2024-07-12 NOTE — TELEPHONE ENCOUNTER
Lvm with our phone # as well as schedule phone # [phone] to please call and schedule US that was ordered.

## 2024-07-18 ENCOUNTER — TELEPHONE (OUTPATIENT)
Dept: INTERNAL MEDICINE CLINIC | Age: 54
End: 2024-07-18

## 2024-07-18 NOTE — TELEPHONE ENCOUNTER
Lvm with our # to call back to review order placed, as well as scheduling ph # to schedule. US of head order printed and mailed to patient.

## 2025-03-15 NOTE — PATIENT INSTRUCTIONS
Take amoxil as directed.  Follow up with ENT if not better. Referral Given  Please call.  Use robaxin for neck spasm.   No

## 2025-03-27 ENCOUNTER — OFFICE VISIT (OUTPATIENT)
Age: 55
End: 2025-03-27

## 2025-03-27 VITALS
WEIGHT: 115.8 LBS | HEIGHT: 61 IN | HEART RATE: 104 BPM | OXYGEN SATURATION: 94 % | TEMPERATURE: 97.9 F | BODY MASS INDEX: 21.86 KG/M2 | RESPIRATION RATE: 16 BRPM | SYSTOLIC BLOOD PRESSURE: 100 MMHG | DIASTOLIC BLOOD PRESSURE: 67 MMHG

## 2025-03-27 DIAGNOSIS — H66.3X1 CHRONIC SUPPURATIVE OTITIS MEDIA OF RIGHT EAR, UNSPECIFIED OTITIS MEDIA LOCATION: Primary | ICD-10-CM

## 2025-03-27 RX ORDER — CIPROFLOXACIN AND DEXAMETHASONE 3; 1 MG/ML; MG/ML
4 SUSPENSION/ DROPS AURICULAR (OTIC) 2 TIMES DAILY
Qty: 7.5 ML | Refills: 0 | Status: SHIPPED | OUTPATIENT
Start: 2025-03-27 | End: 2025-04-06

## 2025-03-27 NOTE — PROGRESS NOTES
Edwina Bacon (:  1970) is a 54 y.o. female,New patient, here for evaluation of the following chief complaint(s):  Ear Pain (Pt c/o bilateral ear pressure/pain and sore throat x on and off for several years )      Assessment & Plan :  Visit Diagnoses and Associated Orders         Chronic suppurative otitis media of right ear, unspecified otitis media location    -  Primary    ciprofloxacin-dexAMETHasone (CIPRODEX) 0.3-0.1 % otic suspension [[id number]]      amoxicillin-clavulanate (AUGMENTIN) 875-125 MG per tablet [[id number]]      [facility] [[id number] Custom]               Chronic otitis media  Augmentin  Ciprodex  Follow up with primary care provider  Consult placed    Patient verbalized understanding of printed and verbal discharge instructions including follow up care.   Follow up with your primary care provider for persistent symptoms.   Follow up in 7 days if symptoms persist or if symptoms worsen.       Subjective :    History provided by:  Patient    HPI:   54 y.o. female presents with symptoms of Ear Pain  Patient complains of ear pain and possible ear infection. Symptoms include bilateral ear pain. Onset of symptoms was 2 weeks ago, gradually worsening since that time. She also c/o 2 week sore throat.  She is drinking plenty of fluids         Vitals:    25 1558   BP: 100/67   BP Site: Right Upper Arm   Patient Position: Sitting   BP Cuff Size: Large Adult   Pulse: (!) 104   Resp: 16   Temp: 97.9 °F (36.6 °C)   TempSrc: Oral   SpO2: 94%   Weight: 52.5 kg (115 lb 12.8 oz)   Height: 1.549 m (5' 1\")          Objective   Physical Exam  Vitals and nursing note reviewed.   Constitutional:       Appearance: Normal appearance.   HENT:      Right Ear: External ear normal. Drainage present. Tympanic membrane is perforated.      Left Ear: Tympanic membrane and ear canal normal.      Mouth/Throat:      Mouth: Mucous membranes are moist.      Pharynx: Posterior

## 2025-03-27 NOTE — PATIENT INSTRUCTIONS
Chronic otitis media  Take medication as prescribed  Make an appointment with new primary care provider for follow up

## 2025-03-31 SDOH — HEALTH STABILITY: PHYSICAL HEALTH: ON AVERAGE, HOW MANY MINUTES DO YOU ENGAGE IN EXERCISE AT THIS LEVEL?: 0 MIN

## 2025-03-31 SDOH — HEALTH STABILITY: PHYSICAL HEALTH: ON AVERAGE, HOW MANY DAYS PER WEEK DO YOU ENGAGE IN MODERATE TO STRENUOUS EXERCISE (LIKE A BRISK WALK)?: 0 DAYS

## 2025-04-02 ENCOUNTER — OFFICE VISIT (OUTPATIENT)
Dept: FAMILY MEDICINE CLINIC | Age: 55
End: 2025-04-02
Payer: MEDICARE

## 2025-04-02 VITALS
SYSTOLIC BLOOD PRESSURE: 122 MMHG | BODY MASS INDEX: 21.73 KG/M2 | OXYGEN SATURATION: 96 % | DIASTOLIC BLOOD PRESSURE: 82 MMHG | TEMPERATURE: 98 F | HEART RATE: 100 BPM | WEIGHT: 115 LBS

## 2025-04-02 DIAGNOSIS — Z00.00 ANNUAL PHYSICAL EXAM: Primary | ICD-10-CM

## 2025-04-02 DIAGNOSIS — C96.20: ICD-10-CM

## 2025-04-02 DIAGNOSIS — E78.2 MIXED HYPERLIPIDEMIA: ICD-10-CM

## 2025-04-02 DIAGNOSIS — J30.2 SEASONAL ALLERGIES: ICD-10-CM

## 2025-04-02 DIAGNOSIS — K21.9 GASTROESOPHAGEAL REFLUX DISEASE, UNSPECIFIED WHETHER ESOPHAGITIS PRESENT: ICD-10-CM

## 2025-04-02 DIAGNOSIS — I10 ESSENTIAL HYPERTENSION: ICD-10-CM

## 2025-04-02 DIAGNOSIS — F20.0 PARANOID SCHIZOPHRENIA (HCC): ICD-10-CM

## 2025-04-02 PROCEDURE — 81002 URINALYSIS NONAUTO W/O SCOPE: CPT | Performed by: FAMILY MEDICINE

## 2025-04-02 PROCEDURE — G0010 ADMIN HEPATITIS B VACCINE: HCPCS | Performed by: FAMILY MEDICINE

## 2025-04-02 PROCEDURE — 99386 PREV VISIT NEW AGE 40-64: CPT | Performed by: FAMILY MEDICINE

## 2025-04-02 PROCEDURE — 3074F SYST BP LT 130 MM HG: CPT | Performed by: FAMILY MEDICINE

## 2025-04-02 PROCEDURE — 90739 HEPB VACC 2/4 DOSE ADULT IM: CPT | Performed by: FAMILY MEDICINE

## 2025-04-02 PROCEDURE — 3079F DIAST BP 80-89 MM HG: CPT | Performed by: FAMILY MEDICINE

## 2025-04-02 SDOH — ECONOMIC STABILITY: FOOD INSECURITY: WITHIN THE PAST 12 MONTHS, YOU WORRIED THAT YOUR FOOD WOULD RUN OUT BEFORE YOU GOT MONEY TO BUY MORE.: NEVER TRUE

## 2025-04-02 SDOH — ECONOMIC STABILITY: FOOD INSECURITY: WITHIN THE PAST 12 MONTHS, THE FOOD YOU BOUGHT JUST DIDN'T LAST AND YOU DIDN'T HAVE MONEY TO GET MORE.: NEVER TRUE

## 2025-04-02 ASSESSMENT — PATIENT HEALTH QUESTIONNAIRE - PHQ9
6. FEELING BAD ABOUT YOURSELF - OR THAT YOU ARE A FAILURE OR HAVE LET YOURSELF OR YOUR FAMILY DOWN: NOT AT ALL
SUM OF ALL RESPONSES TO PHQ QUESTIONS 1-9: 5
SUM OF ALL RESPONSES TO PHQ QUESTIONS 1-9: 5
9. THOUGHTS THAT YOU WOULD BE BETTER OFF DEAD, OR OF HURTING YOURSELF: NOT AT ALL
5. POOR APPETITE OR OVEREATING: NOT AT ALL
SUM OF ALL RESPONSES TO PHQ QUESTIONS 1-9: 5
4. FEELING TIRED OR HAVING LITTLE ENERGY: NOT AT ALL
7. TROUBLE CONCENTRATING ON THINGS, SUCH AS READING THE NEWSPAPER OR WATCHING TELEVISION: NOT AT ALL
3. TROUBLE FALLING OR STAYING ASLEEP: SEVERAL DAYS
SUM OF ALL RESPONSES TO PHQ QUESTIONS 1-9: 5
8. MOVING OR SPEAKING SO SLOWLY THAT OTHER PEOPLE COULD HAVE NOTICED. OR THE OPPOSITE, BEING SO FIGETY OR RESTLESS THAT YOU HAVE BEEN MOVING AROUND A LOT MORE THAN USUAL: NOT AT ALL
10. IF YOU CHECKED OFF ANY PROBLEMS, HOW DIFFICULT HAVE THESE PROBLEMS MADE IT FOR YOU TO DO YOUR WORK, TAKE CARE OF THINGS AT HOME, OR GET ALONG WITH OTHER PEOPLE: NOT DIFFICULT AT ALL
2. FEELING DOWN, DEPRESSED OR HOPELESS: MORE THAN HALF THE DAYS
1. LITTLE INTEREST OR PLEASURE IN DOING THINGS: MORE THAN HALF THE DAYS

## 2025-04-02 NOTE — PROGRESS NOTES
Subjective:       Edwina Bacon is a 54 y.o. female and is here for a comprehensive physical exam.  The patient reports problems -patient reports she has been advised she has history of hypertension but has been trying to control blood pressure by adhering to good diet and some lifestyle modifications?  She is not currently on any medications and blood pressure today is okay.  Patient also reports she has right ruptured eardrum.  She has been evaluated by ENT and was advised she needs surgical repair but reports she does not have time to have that done at this time.  She is currently staying with her elderly parents who she has been trying to take care of.  She is requesting hepatitis B vaccine today because she wants to protect her liver?  She does not have history of hepatitis B infection or exposure.  Patient also has diagnosis of schizophrenia but reports he does not agree with the diagnosis and she is not currently on any treatment.  Denies symptoms of depression or anxiety or suicidal ideations or paranoia.  She does smoke a pack of cigarettes daily.  She also has history of hyperlipidemia but not currently on any medication.  Patient also reports she is found a'knot\" on right upper chest below clavicle.  Patient has been advised that she needs some further testing possibly an ultrasound to evaluate this but reports she does not have time to get that done so she has declined further testing.  Patient is also requesting muscle relaxer because she has had fairly chronic problems with her neck and neck stiffness.  Reports she has been given methocarbamol in the past and that is what she would like to try?  No upper extremity symptoms or neurologic symptoms.  Patient's chart also indicates history of malignant mast cell of lymph nodes?.  Patient reports that she was diagnosed with this several years or more ago.  She has not had any follow-up for this but she does not think she needs a follow-up since she